# Patient Record
Sex: FEMALE
[De-identification: names, ages, dates, MRNs, and addresses within clinical notes are randomized per-mention and may not be internally consistent; named-entity substitution may affect disease eponyms.]

---

## 2021-06-30 PROBLEM — Z00.00 ENCOUNTER FOR PREVENTIVE HEALTH EXAMINATION: Status: ACTIVE | Noted: 2021-06-30

## 2021-07-01 ENCOUNTER — APPOINTMENT (OUTPATIENT)
Dept: HEMATOLOGY ONCOLOGY | Facility: CLINIC | Age: 46
End: 2021-07-01
Payer: COMMERCIAL

## 2021-07-01 VITALS
OXYGEN SATURATION: 99 % | WEIGHT: 174 LBS | HEART RATE: 82 BPM | SYSTOLIC BLOOD PRESSURE: 140 MMHG | HEIGHT: 64 IN | TEMPERATURE: 97.1 F | BODY MASS INDEX: 29.71 KG/M2 | DIASTOLIC BLOOD PRESSURE: 78 MMHG

## 2021-07-01 DIAGNOSIS — Z83.3 FAMILY HISTORY OF DIABETES MELLITUS: ICD-10-CM

## 2021-07-01 DIAGNOSIS — Z78.9 OTHER SPECIFIED HEALTH STATUS: ICD-10-CM

## 2021-07-01 DIAGNOSIS — Z82.49 FAMILY HISTORY OF ISCHEMIC HEART DISEASE AND OTHER DISEASES OF THE CIRCULATORY SYSTEM: ICD-10-CM

## 2021-07-01 DIAGNOSIS — Z80.9 FAMILY HISTORY OF MALIGNANT NEOPLASM, UNSPECIFIED: ICD-10-CM

## 2021-07-01 DIAGNOSIS — L60.9 NAIL DISORDER, UNSPECIFIED: ICD-10-CM

## 2021-07-01 DIAGNOSIS — O16.9 UNSPECIFIED MATERNAL HYPERTENSION, UNSPECIFIED TRIMESTER: ICD-10-CM

## 2021-07-01 LAB
RBC # BLD: 5.2 M/UL
RETICS # AUTO: 1.1 %
RETICS AGGREG/RBC NFR: 55.7 K/UL

## 2021-07-01 PROCEDURE — 36415 COLL VENOUS BLD VENIPUNCTURE: CPT

## 2021-07-01 PROCEDURE — 99243 OFF/OP CNSLTJ NEW/EST LOW 30: CPT | Mod: 25

## 2021-07-01 PROCEDURE — 99072 ADDL SUPL MATRL&STAF TM PHE: CPT

## 2021-07-01 RX ORDER — ASCORBIC ACID 500 MG
TABLET ORAL
Refills: 0 | Status: ACTIVE | COMMUNITY

## 2021-07-01 RX ORDER — GLUC/MSM/COLGN2/HYAL/ANTIARTH3 375-375-20
TABLET ORAL
Refills: 0 | Status: ACTIVE | COMMUNITY

## 2021-07-01 RX ORDER — UBIDECARENONE/VIT E ACET 100MG-5
1000 CAPSULE ORAL
Refills: 0 | Status: ACTIVE | COMMUNITY

## 2021-07-02 LAB
ALBUMIN SERPL ELPH-MCNC: 4.5 G/DL
ALP BLD-CCNC: 93 U/L
ALT SERPL-CCNC: 12 U/L
ANION GAP SERPL CALC-SCNC: 12 MMOL/L
ANISOCYTOSIS BLD QL: NORMAL
AST SERPL-CCNC: 12 U/L
BASOPHILS # BLD AUTO: 0.13 K/UL
BASOPHILS # BLD AUTO: 0.13 K/UL
BASOPHILS NFR BLD AUTO: 1.7 %
BASOPHILS NFR BLD AUTO: 1.7 %
BILIRUB SERPL-MCNC: 0.2 MG/DL
BUN SERPL-MCNC: 13 MG/DL
CALCIUM SERPL-MCNC: 9.4 MG/DL
CHLORIDE SERPL-SCNC: 104 MMOL/L
CO2 SERPL-SCNC: 22 MMOL/L
CREAT SERPL-MCNC: 0.5 MG/DL
DACRYOCYTES BLD QL SMEAR: SLIGHT
EOSINOPHIL # BLD AUTO: 0.07 K/UL
EOSINOPHIL # BLD AUTO: 0.07 K/UL
EOSINOPHIL NFR BLD AUTO: 0.9 %
EOSINOPHIL NFR BLD AUTO: 0.9 %
FERRITIN SERPL-MCNC: 2 NG/ML
FOLATE SERPL-MCNC: 9.3 NG/ML
GLUCOSE SERPL-MCNC: 90 MG/DL
HCT VFR BLD CALC: 34 %
HGB BLD-MCNC: 8.7 G/DL
HYPOCHROMIA BLD QL SMEAR: SLIGHT
IRON SATN MFR SERPL: 2 %
IRON SERPL-MCNC: 12 UG/DL
LYMPHOCYTES # BLD AUTO: 1.49 K/UL
LYMPHOCYTES # BLD AUTO: 1.49 K/UL
LYMPHOCYTES NFR BLD AUTO: 20 %
LYMPHOCYTES NFR BLD AUTO: 20 %
MAN DIFF?: NORMAL
MCHC RBC-ENTMCNC: 16.7 PG
MCHC RBC-ENTMCNC: 25.6 GM/DL
MCV RBC AUTO: 65.4 FL
MICROCYTES BLD QL SMEAR: NORMAL
MONOCYTES # BLD AUTO: 0.33 K/UL
MONOCYTES # BLD AUTO: 0.33 K/UL
MONOCYTES NFR BLD AUTO: 4.4 %
MONOCYTES NFR BLD AUTO: 4.4 %
MSMEAR: NORMAL
NEUTROPHILS # BLD AUTO: 5.45 K/UL
NEUTROPHILS # BLD AUTO: 5.45 K/UL
NEUTROPHILS NFR BLD AUTO: 73 %
NEUTROPHILS NFR BLD AUTO: 73 %
OVALOCYTES BLD QL SMEAR: SLIGHT
PLAT MORPH BLD: ABNORMAL
PLATELET # BLD AUTO: 432 K/UL
POIKILOCYTOSIS BLD QL SMEAR: NORMAL
POLYCHROMASIA BLD QL SMEAR: SLIGHT
POTASSIUM SERPL-SCNC: 4.6 MMOL/L
PROT SERPL-MCNC: 7.5 G/DL
RBC # BLD: 5.2 M/UL
RBC # FLD: 22.2 %
RBC BLD AUTO: ABNORMAL
SCHISTOCYTES BLD QL SMEAR: SLIGHT
SODIUM SERPL-SCNC: 138 MMOL/L
SPHEROCYTES BLD QL SMEAR: SLIGHT
TARGETS BLD QL SMEAR: SLIGHT
TIBC SERPL-MCNC: 513 UG/DL
UIBC SERPL-MCNC: 501 UG/DL
VIT B12 SERPL-MCNC: 840 PG/ML
WBC # FLD AUTO: 7.47 K/UL

## 2021-07-06 LAB — ANA SER IF-ACNC: NEGATIVE

## 2021-07-07 ENCOUNTER — NON-APPOINTMENT (OUTPATIENT)
Age: 46
End: 2021-07-07

## 2021-08-04 ENCOUNTER — APPOINTMENT (OUTPATIENT)
Dept: HEMATOLOGY ONCOLOGY | Facility: CLINIC | Age: 46
End: 2021-08-04
Payer: COMMERCIAL

## 2021-08-04 VITALS
WEIGHT: 174 LBS | HEART RATE: 80 BPM | TEMPERATURE: 96.9 F | DIASTOLIC BLOOD PRESSURE: 82 MMHG | BODY MASS INDEX: 29.71 KG/M2 | HEIGHT: 64 IN | OXYGEN SATURATION: 100 % | SYSTOLIC BLOOD PRESSURE: 130 MMHG

## 2021-08-04 LAB
RBC # BLD: 5.43 M/UL
RETICS # AUTO: 1.1 %
RETICS AGGREG/RBC NFR: 57.9 K/UL

## 2021-08-04 PROCEDURE — 99213 OFFICE O/P EST LOW 20 MIN: CPT | Mod: 25

## 2021-08-04 PROCEDURE — 36415 COLL VENOUS BLD VENIPUNCTURE: CPT

## 2021-08-04 NOTE — PHYSICAL EXAM
[Normal] : affect appropriate [de-identified] : overweight [de-identified] : RRR, S1, S2, ?murmur [de-identified] : Has thinning hair, alopecia

## 2021-08-04 NOTE — END OF VISIT
[FreeTextEntry3] : All medical record entries made by the Scribe were at my, Dr. Prachi Chou, direction and personally dictated by me on 08/04/2021. I have reviewed the chart and agree that the record accurately reflects my personal performance of the history, physical exam, assessment and plan. I have also personally directed, reviewed, and agreed with the chart.

## 2021-08-04 NOTE — HISTORY OF PRESENT ILLNESS
[de-identified] : Patient is a 46 year old female with a history of iron deficiency anemia at least since she gave birth to her 3rd child 5 years ago, otherwise in generally good health, who is referred for hematologic evaluation. Patient admits to heavy menses, and states she does not have fibroids to the best of her knowledge but is soon to consult with her gynecologist..  Thyroid function tests in May of 2021 were normal. No recent CBC is available but patient states she has frequently been told of low blood counts. Patient was taking ferrous sulfate for a short period of time, but could not tolerate it due to gastric upset and vomiting. She has switched to lower dose iron supplements, which have been better tolerated.She has not yet had a colonoscopy. Patient does not follow a vegetarian diet. Patient complains of fatigue, hair loss, and fingernail fragility. Denies craving ice (pica), recent infection, fever, sweats, or chills.

## 2021-08-04 NOTE — END OF VISIT
[FreeTextEntry3] : All medical record entries made by the Scribe were at my, Dr. Prachi Chou, direction and personally dictated by me on 07/01/2021. I have reviewed the chart and agree that the record accurately reflects my personal performance of the history, physical exam, assessment and plan. I have also personally directed, reviewed, and agreed with the chart.

## 2021-08-04 NOTE — REVIEW OF SYSTEMS
[Fatigue] : fatigue [Negative] : Allergic/Immunologic [Fever] : no fever [Chills] : no chills [Night Sweats] : no night sweats [Recent Change In Weight] : ~T no recent weight change [Nosebleeds] : no nosebleeds [Easy Bleeding] : no tendency for easy bleeding [Easy Bruising] : no tendency for easy bruising [FreeTextEntry4] : No gingival bleeding [FreeTextEntry8] : Heavy periods [de-identified] : Heavy periods

## 2021-08-04 NOTE — ASSESSMENT
[FreeTextEntry1] : Patient is a 46 year old female with a history of chronic iron deficiency anemia , otherwise in generally good health, who presents for hematologic follow-up. Possible etiologies include heavy menstrual blood loss, nutritional deficiency due to lack of absorption, underlying hemoglobinopathy, or other source of  bleeding. Patient remains on low-dose oral iron with Vitamin C. Have discussed the possibility of giving patient iron by infusion, pending blood results. Recommend screening colonoscopy and follow up with patient's gynecologist. Have ordered CBC, iron panel, ferritin, and reticulocyte count. Patient was advised to call office to discuss results and next appointment date.\par

## 2021-08-04 NOTE — REASON FOR VISIT
[Follow-Up Visit] : a follow-up visit for [FreeTextEntry2] : Iron deficiency Anemia, fatigue, hair loss

## 2021-08-04 NOTE — CONSULT LETTER
[Dear  ___] : Dear  [unfilled], [Consult Letter:] : I had the pleasure of evaluating your patient, [unfilled]. [( Thank you for referring [unfilled] for consultation for _____ )] : Thank you for referring [unfilled] for consultation for [unfilled] [Please see my note below.] : Please see my note below. [Consult Closing:] : Thank you very much for allowing me to participate in the care of this patient.  If you have any questions, please do not hesitate to contact me. [Sincerely,] : Sincerely, [FreeTextEntry3] : Prachi Chou

## 2021-08-04 NOTE — HISTORY OF PRESENT ILLNESS
[de-identified] : Patient is a 46 year old female with a history of iron deficiency anemia  since she gave birth to her 3rd child 5 years ago, otherwise in generally good health, who presents for hematologic follow-up. Patient admits to heavy menses, and states she does not have fibroids to the best of her knowledge but is soon to consult with her gynecologist. Blood results from July 2021 revealed the hemoglobin was low at 8.7 with a hematocrit of 34.0. White blood count was normal at 7.47 and the platelet count was slightly elevated at 432,000, likely due to patient's iron deficiency. Comprehensive metabolic panel was normal. Iron studies revealed a low serum iron at 12, an elevated TIBC at 513, a low saturation at 2%, and a low ferritin at 2. B12 and folate were normal. Patient takes low dose iron supplements twice per day with vitamin C, which she tolerates better than ferrous sulfate. She has not yet had a colonoscopy. Denies following a vegetarian diet. Patient complains of fatigue, fingernail fragility, and hair loss. Denies bleeding from nose our mouth, blood in urine or stool, recent infection, fever, sweats, or chills.

## 2021-08-04 NOTE — ASSESSMENT
[FreeTextEntry1] : Patient is a 46 year old female with a history of iron deficiency anemia at least since she gave birth to her 3rd child 5 years ago, otherwise in generally good health, who is referred for hematologic evaluation. Possible etiologies include heavy menstrual blood loss, nutritional deficiency due to lack of absorption, underlying hemoglobinopathy, or other bleeding. Patient will continue on oral iron with Vitamin C, and was advised to avoid caffeine before and after taking it to maximize absorption. Have discussed the possibility of giving patient iron by infusion, pending blood results. Recommend screening colonoscopy. Have ordered JOSEFINA, B12 and Folate, CBC, CMP, ferritin iron panel, and reticulocyte count. Patient was advised to call office to discuss results and further recommendations.

## 2021-08-04 NOTE — ADDENDUM
[FreeTextEntry1] : I, Franchesca Galeana, acted solely as a scribe for Dr. Prachi Chou on 07/01/2021.

## 2021-08-05 LAB
ACANTHOCYTES BLD QL SMEAR: SLIGHT
ANISOCYTOSIS BLD QL: SLIGHT
BASOPHILS # BLD AUTO: 0.07 K/UL
BASOPHILS # BLD AUTO: 0.07 K/UL
BASOPHILS NFR BLD AUTO: 0.9 %
BASOPHILS NFR BLD AUTO: 0.9 %
BURR CELLS BLD QL SMEAR: PRESENT
DACRYOCYTES BLD QL SMEAR: SLIGHT
EOSINOPHIL # BLD AUTO: 0.26 K/UL
EOSINOPHIL # BLD AUTO: 0.26 K/UL
EOSINOPHIL NFR BLD AUTO: 3.5 %
EOSINOPHIL NFR BLD AUTO: 3.5 %
FERRITIN SERPL-MCNC: 5 NG/ML
GIANT PLATELETS BLD QL SMEAR: PRESENT
HCT VFR BLD CALC: 36.7 %
HGB BLD-MCNC: 9.5 G/DL
HYPOCHROMIA BLD QL SMEAR: NORMAL
IRON SATN MFR SERPL: 3 %
IRON SERPL-MCNC: 13 UG/DL
LYMPHOCYTES # BLD AUTO: 2.46 K/UL
LYMPHOCYTES # BLD AUTO: 2.46 K/UL
LYMPHOCYTES NFR BLD AUTO: 33 %
LYMPHOCYTES NFR BLD AUTO: 33 %
MAN DIFF?: NORMAL
MCHC RBC-ENTMCNC: 17.5 PG
MCHC RBC-ENTMCNC: 25.9 GM/DL
MCV RBC AUTO: 67.6 FL
MICROCYTES BLD QL SMEAR: SLIGHT
MONOCYTES # BLD AUTO: 0.46 K/UL
MONOCYTES # BLD AUTO: 0.46 K/UL
MONOCYTES NFR BLD AUTO: 6.1 %
MONOCYTES NFR BLD AUTO: 6.1 %
MSMEAR: NORMAL
NEUTROPHILS # BLD AUTO: 4.21 K/UL
NEUTROPHILS # BLD AUTO: 4.21 K/UL
NEUTROPHILS NFR BLD AUTO: 56.5 %
NEUTROPHILS NFR BLD AUTO: 56.5 %
OVALOCYTES BLD QL SMEAR: NORMAL
PLAT MORPH BLD: ABNORMAL
PLATELET # BLD AUTO: 406 K/UL
POIKILOCYTOSIS BLD QL SMEAR: SLIGHT
POLYCHROMASIA BLD QL SMEAR: SLIGHT
RBC # BLD: 5.43 M/UL
RBC # FLD: 21.1 %
RBC BLD AUTO: ABNORMAL
SCHISTOCYTES BLD QL SMEAR: SLIGHT
TARGETS BLD QL SMEAR: SLIGHT
TIBC SERPL-MCNC: 460 UG/DL
UIBC SERPL-MCNC: 447 UG/DL
WBC # FLD AUTO: 7.46 K/UL

## 2021-08-25 ENCOUNTER — NON-APPOINTMENT (OUTPATIENT)
Age: 46
End: 2021-08-25

## 2021-10-27 ENCOUNTER — APPOINTMENT (OUTPATIENT)
Dept: HEMATOLOGY ONCOLOGY | Facility: CLINIC | Age: 46
End: 2021-10-27

## 2021-11-11 ENCOUNTER — APPOINTMENT (OUTPATIENT)
Dept: HEMATOLOGY ONCOLOGY | Facility: CLINIC | Age: 46
End: 2021-11-11
Payer: COMMERCIAL

## 2021-11-11 VITALS
OXYGEN SATURATION: 98 % | BODY MASS INDEX: 31.41 KG/M2 | HEART RATE: 76 BPM | TEMPERATURE: 96.7 F | WEIGHT: 184 LBS | HEIGHT: 64 IN | DIASTOLIC BLOOD PRESSURE: 70 MMHG | SYSTOLIC BLOOD PRESSURE: 122 MMHG

## 2021-11-11 DIAGNOSIS — R53.83 OTHER FATIGUE: ICD-10-CM

## 2021-11-11 DIAGNOSIS — D50.0 IRON DEFICIENCY ANEMIA SECONDARY TO BLOOD LOSS (CHRONIC): ICD-10-CM

## 2021-11-11 DIAGNOSIS — D75.838 OTHER THROMBOCYTOSIS: ICD-10-CM

## 2021-11-11 DIAGNOSIS — L65.9 NONSCARRING HAIR LOSS, UNSPECIFIED: ICD-10-CM

## 2021-11-11 DIAGNOSIS — D50.9 IRON DEFICIENCY ANEMIA, UNSPECIFIED: ICD-10-CM

## 2021-11-11 PROCEDURE — 99213 OFFICE O/P EST LOW 20 MIN: CPT | Mod: 25

## 2021-11-11 PROCEDURE — 36415 COLL VENOUS BLD VENIPUNCTURE: CPT

## 2021-11-11 NOTE — REVIEW OF SYSTEMS
[Fatigue] : fatigue [Dysmenorrhea/Abn Vaginal Bleeding] : dysmenorrhea/abnormal vaginal bleeding [Negative] : Allergic/Immunologic [Fever] : no fever [Chills] : no chills [Night Sweats] : no night sweats [Recent Change In Weight] : ~T no recent weight change [Chest Pain] : no chest pain [Palpitations] : no palpitations [Shortness Of Breath] : no shortness of breath [SOB on Exertion] : no shortness of breath during exertion [Dizziness] : no dizziness [Easy Bleeding] : no tendency for easy bleeding [Easy Bruising] : no tendency for easy bruising [FreeTextEntry2] : does get sweats in the afternoon [FreeTextEntry8] : Still with heavy menses. Seeing gynecologist [de-identified] : No other bleeding

## 2021-11-11 NOTE — ASSESSMENT
[FreeTextEntry1] : Patient is a 46 year old female with a history of chronic iron deficiency anemia , otherwise in generally good health, who presents for hematologic follow-up. Iron deficiency anemia is most likely due to heavy menstrual blood loss. Other possible etiologies include nutritional deficiency due to lack of absorption, underlying hemoglobinopathy, or other source of bleeding. Thrombocytosis is reactive to bleeding and iron deficiency.. Recommend that patient have a screening colonoscopy. Patient remains on low-dose oral iron with Vitamin C. She saw her gynecologist last week for an ultrasound, and states her hemoglobin was in the 7-8 range at that time. Have ordered B12 and folate, CBC, CMP, ferritin, iron panel, phosphorus, and reticulocyte count. Need for blood transfusion and/or iron infusion to be determined pending results. Have discussed with the patient the possibility of receiving iron by infusion, and thoroughly discussed the risks of the procedure including dizziness, rapid blood pressure changes, allergic reaction, and permanent staining with extravasation. A consent form was signed in the event that iron by infusion is prescribed. Patient was advised to call office to discuss results and next appointment date.

## 2021-11-11 NOTE — HISTORY OF PRESENT ILLNESS
[de-identified] : Patient is a 46 year old female with a history of iron deficiency anemia since she gave birth to her 3rd child 5 years ago, otherwise in generally good health, who presents for hematologic follow-up. Patient admits to very heavy menses. Last week, she saw a gynecologist, and states that her hemoglobin at that time was found to be in the 7-8 range. She had an ultrasound and is seeing her gynecologist later today to discuss the results. At her last visit in August, patient's hemoglobin was slightly improved at 9.5, (previously 8.7), and the platelet count had decreased from 423,000 down to 406,000. The RBC number was elevated to 5.43, suggesting a possible underlying hemoglobinopathy. The iron saturation was very low at 3% and a ferritin was still low at 5. Patient takes low dose iron supplements twice per day with vitamin C. She has not yet had a colonoscopy. Denies following a vegetarian diet. Patient complains of hot flashes, fatigue, and hair loss. She drinks large amounts of coffee. Denies bleeding from the nose or mouth, dizziness, shortness of breath, palpitations, craving ice (pica), restless leg, fever, chills, or recent infection. Of note, patient received a flu vaccine.

## 2021-11-11 NOTE — END OF VISIT
[FreeTextEntry3] : All medical record entries made by the Scribe were at my, Dr. Prachi Chou, direction and personally dictated by me on 11/11/2021. I have reviewed the chart and agree that the record accurately reflects my personal performance of the history, physical exam, assessment and plan. I have also personally directed, reviewed, and agreed with the chart.

## 2021-11-11 NOTE — REASON FOR VISIT
[Follow-Up Visit] : a follow-up visit for [FreeTextEntry2] : Iron deficiency anemia from chronic blood loss, fatigue

## 2021-11-11 NOTE — PHYSICAL EXAM
[Normal] : affect appropriate [de-identified] : overweight [de-identified] : mild conjunctival pallor [de-identified] : RRR, S1, S2, ?murmur [de-identified] : Has thinning hair, alopecia

## 2021-11-12 LAB
ALBUMIN SERPL ELPH-MCNC: 4.4 G/DL
ALP BLD-CCNC: 94 U/L
ALT SERPL-CCNC: 7 U/L
ANION GAP SERPL CALC-SCNC: 13 MMOL/L
ANISOCYTOSIS BLD QL: SLIGHT
AST SERPL-CCNC: 12 U/L
BASOPHILS NFR BLD AUTO: 1 %
BASOPHILS NFR BLD AUTO: 1 %
BILIRUB SERPL-MCNC: <0.2 MG/DL
BUN SERPL-MCNC: 15 MG/DL
CALCIUM SERPL-MCNC: 9.5 MG/DL
CHLORIDE SERPL-SCNC: 103 MMOL/L
CO2 SERPL-SCNC: 24 MMOL/L
CREAT SERPL-MCNC: 0.49 MG/DL
EOSINOPHIL NFR BLD AUTO: 1 %
EOSINOPHIL NFR BLD AUTO: 1 %
FERRITIN SERPL-MCNC: 12 NG/ML
FOLATE SERPL-MCNC: 4.5 NG/ML
GLUCOSE SERPL-MCNC: 70 MG/DL
HCT VFR BLD CALC: 41.2 %
HGB BLD-MCNC: 11.5 G/DL
HYPOCHROMIA BLD QL SMEAR: SLIGHT
IRON SATN MFR SERPL: 5 %
IRON SERPL-MCNC: 20 UG/DL
LYMPHOCYTES NFR BLD AUTO: 24 %
LYMPHOCYTES NFR BLD AUTO: 24 %
MAN DIFF?: NORMAL
MCHC RBC-ENTMCNC: 20.3 PG
MCHC RBC-ENTMCNC: 27.9 GM/DL
MCV RBC AUTO: 72.7 FL
MONOCYTES NFR BLD AUTO: 9 %
MONOCYTES NFR BLD AUTO: 9 %
NEUTROPHILS NFR BLD AUTO: 66 %
NEUTROPHILS NFR BLD AUTO: 66 %
OVALOCYTES BLD QL SMEAR: SLIGHT
PHOSPHATE SERPL-MCNC: 4.1 MG/DL
PLAT MORPH BLD: ABNORMAL
PLATELET # BLD AUTO: 389 K/UL
POIKILOCYTOSIS BLD QL SMEAR: SLIGHT
POLYCHROMASIA BLD QL SMEAR: SLIGHT
POTASSIUM SERPL-SCNC: 4.8 MMOL/L
PROT SERPL-MCNC: 7.3 G/DL
RBC # BLD: 5.67 M/UL
RBC # BLD: 5.67 M/UL
RBC # FLD: 22.5 %
RBC BLD AUTO: ABNORMAL
RETICS # AUTO: 0.7 %
RETICS AGGREG/RBC NFR: 41.5 K/UL
SCHISTOCYTES BLD QL SMEAR: SLIGHT
SODIUM SERPL-SCNC: 139 MMOL/L
SPHEROCYTES BLD QL SMEAR: SLIGHT
TIBC SERPL-MCNC: 421 UG/DL
UIBC SERPL-MCNC: 401 UG/DL
VIT B12 SERPL-MCNC: 640 PG/ML
WBC # FLD AUTO: 9.68 K/UL

## 2021-11-24 ENCOUNTER — NON-APPOINTMENT (OUTPATIENT)
Age: 46
End: 2021-11-24

## 2021-12-04 ENCOUNTER — TRANSCRIPTION ENCOUNTER (OUTPATIENT)
Age: 46
End: 2021-12-04